# Patient Record
Sex: FEMALE | Race: BLACK OR AFRICAN AMERICAN
[De-identification: names, ages, dates, MRNs, and addresses within clinical notes are randomized per-mention and may not be internally consistent; named-entity substitution may affect disease eponyms.]

---

## 2015-11-06 VITALS — DIASTOLIC BLOOD PRESSURE: 70 MMHG | SYSTOLIC BLOOD PRESSURE: 121 MMHG

## 2017-04-25 ENCOUNTER — HOSPITAL ENCOUNTER (OUTPATIENT)
Dept: HOSPITAL 61 - CT | Age: 62
Discharge: HOME | End: 2017-04-25
Attending: INTERNAL MEDICINE
Payer: COMMERCIAL

## 2017-04-25 DIAGNOSIS — K42.9: Primary | ICD-10-CM

## 2017-04-25 LAB
CREAT SERPL-MCNC: 1.1 MG/DL (ref 0.6–1)
GFR SERPLBLD BASED ON 1.73 SQ M-ARVRAT: 61.5 ML/MIN

## 2017-04-25 PROCEDURE — 36415 COLL VENOUS BLD VENIPUNCTURE: CPT

## 2017-04-25 PROCEDURE — 74176 CT ABD & PELVIS W/O CONTRAST: CPT

## 2017-04-25 PROCEDURE — 82565 ASSAY OF CREATININE: CPT

## 2017-04-25 NOTE — RAD
CT abdomen and pelvis with IV contrast  



History: Abdominal pain, nausea and constipation for numerous years.



Comparison: CT abdomen pelvis 11/2/2012



Technique: After administration of oral contrast only, helical CT of the

abdomen and pelvis was performed from the lung bases through the ischial

tuberosities. Axial, sagittal, and coronal reconstructions were obtained. No

intravenous contrast was administered as proper IV access cannot be obtained.



One or more of the following individualized dose reduction techniques were

utilized for the study:



Automated exposure control

Adjustment of mA and/or kV according to patient's size

Use of iterative reconstruction technique.



Findings:



Evaluation of solid organs is limited by lack of intravenous contrast.



Gallbladder is absent. Liver, spleen, pancreas, and bilateral adrenal glands

are unremarkable.  Bilateral kidneys are without evidence of stone or

obstruction. There is no evidence of bowel obstruction.  No free air or free

fluid is identified in the abdomen or pelvis. Uterus demonstrates changes of

bilateral tubal ligation.



There are 3 small fat-containing ventral hernias.



Appendix appears within normal limits.  Urinary bladder is unremarkable.  



Impression:

1.  No acute abnormality identified in the abdomen or pelvis.

2.  Several small fat-containing umbilical hernias.

## 2017-05-26 ENCOUNTER — HOSPITAL ENCOUNTER (OUTPATIENT)
Dept: HOSPITAL 61 - ENDOS | Age: 62
Discharge: HOME | End: 2017-05-26
Attending: INTERNAL MEDICINE
Payer: COMMERCIAL

## 2017-05-26 VITALS — DIASTOLIC BLOOD PRESSURE: 58 MMHG | SYSTOLIC BLOOD PRESSURE: 12 MMHG

## 2017-05-26 DIAGNOSIS — K64.0: Primary | ICD-10-CM

## 2017-05-26 DIAGNOSIS — J44.9: ICD-10-CM

## 2017-05-26 DIAGNOSIS — F41.9: ICD-10-CM

## 2017-05-26 DIAGNOSIS — Z90.49: ICD-10-CM

## 2017-05-26 DIAGNOSIS — Z98.51: ICD-10-CM

## 2017-05-26 DIAGNOSIS — Z86.69: ICD-10-CM

## 2017-05-26 DIAGNOSIS — M19.90: ICD-10-CM

## 2017-05-26 DIAGNOSIS — Z98.42: ICD-10-CM

## 2017-05-26 DIAGNOSIS — E11.9: ICD-10-CM

## 2017-05-26 DIAGNOSIS — Z87.891: ICD-10-CM

## 2017-05-26 PROCEDURE — 45378 DIAGNOSTIC COLONOSCOPY: CPT

## 2017-05-26 NOTE — HP
ADMIT DATE:  05/26/2017



HISTORY OF PRESENT ILLNESS:  A 61-year-old -American female whose past

medical history is significant for anxiety, COPD, chronic abdominal pain _____

worsening constipation, increased change in bowel habits, despite being on

stable doses of OxyContin.  Previous colonoscopy has been unrevealing, with Dr. Alford.  She has also had increased nausea, but no dysphagia or odynophagia. 

With continued symptoms, she requests additional evaluation.



PAST MEDICAL HISTORY:  Anxiety, chronic pain, diabetes, depression.



MEDICATIONS:  Include Xanax, Nexium, Advair, DuoNeb, metformin, oxycodone,

prednisone, atropine, albuterol, promethazine.



ALLERGIES:  She has no allergies.



SOCIAL HISTORY:  She is a former smoker, social, nondrinker.



PAST SURGICAL HISTORY:  Cholecystectomy, eye surgery, and inguinal hernia

repair.



REVIEW OF SYSTEMS:  Per records.



PHYSICAL EXAMINATION:

GENERAL:  Reveals a well-nourished, well-developed -American female.

VITAL SIGNS:  Temperature is 97, pulse 65, respirations 18.

HEENT:  Normocephalic and atraumatic head.  Pupils and extraocular muscles not

tested.  Sclerae anicteric.

NECK:  Supple.

LUNGS:  Clear.

CARDIOVASCULAR:  Reveals an S1, S2 without S3, S4 or appreciable murmur.

ABDOMEN:  Soft abdomen, normal bowel sounds, without appreciable

hepatosplenomegaly.

EXTREMITIES:  Reveals no cyanosis, clubbing, or edema.



IMPRESSION:  Change in bowel habits, constipation, diffuse abdominal pain,

etiology is to be determined.  Differential includes adhesions, colon cancer,

inflammatory bowel disease, diverticular disease.  Previous CT scan was

unrevealing for additional pathology.  Therefore, recommend colonoscopy, this is

unrevealing a small bowel series may be pursued.



I thank Dr. Blue for allowing us to consult and participate in patient's care.

 



______________________________

JOSE ENRIQUE MORENO MD DR:  SSP/suresh  JOB#:  [id number] / [id number]

DD:  05/26/2017 10:29  DT:  05/26/2017 11:22

## 2018-02-28 ENCOUNTER — HOSPITAL ENCOUNTER (OUTPATIENT)
Dept: HOSPITAL 61 - MAMMO | Age: 63
Discharge: HOME | End: 2018-02-28
Attending: FAMILY MEDICINE
Payer: COMMERCIAL

## 2018-02-28 DIAGNOSIS — Z12.31: Primary | ICD-10-CM

## 2018-02-28 PROCEDURE — 77067 SCR MAMMO BI INCL CAD: CPT

## 2018-03-01 ENCOUNTER — HOSPITAL ENCOUNTER (OUTPATIENT)
Dept: HOSPITAL 61 - NM | Age: 63
Discharge: HOME | End: 2018-03-01
Attending: INTERNAL MEDICINE
Payer: COMMERCIAL

## 2018-03-01 DIAGNOSIS — K86.1: ICD-10-CM

## 2018-03-01 DIAGNOSIS — K30: Primary | ICD-10-CM

## 2018-03-01 PROCEDURE — 78264 GASTRIC EMPTYING IMG STUDY: CPT

## 2018-08-30 ENCOUNTER — HOSPITAL ENCOUNTER (OUTPATIENT)
Dept: HOSPITAL 61 - ENDOS | Age: 63
Discharge: HOME | End: 2018-08-30
Attending: INTERNAL MEDICINE
Payer: COMMERCIAL

## 2018-08-30 VITALS
DIASTOLIC BLOOD PRESSURE: 68 MMHG | DIASTOLIC BLOOD PRESSURE: 68 MMHG | SYSTOLIC BLOOD PRESSURE: 117 MMHG | SYSTOLIC BLOOD PRESSURE: 117 MMHG | DIASTOLIC BLOOD PRESSURE: 68 MMHG | SYSTOLIC BLOOD PRESSURE: 117 MMHG | DIASTOLIC BLOOD PRESSURE: 68 MMHG | SYSTOLIC BLOOD PRESSURE: 117 MMHG

## 2018-08-30 DIAGNOSIS — Z79.84: ICD-10-CM

## 2018-08-30 DIAGNOSIS — Z79.899: ICD-10-CM

## 2018-08-30 DIAGNOSIS — Z90.49: ICD-10-CM

## 2018-08-30 DIAGNOSIS — Z98.890: ICD-10-CM

## 2018-08-30 DIAGNOSIS — K29.50: Primary | ICD-10-CM

## 2018-08-30 DIAGNOSIS — Z72.89: ICD-10-CM

## 2018-08-30 DIAGNOSIS — Z98.51: ICD-10-CM

## 2018-08-30 DIAGNOSIS — G89.29: ICD-10-CM

## 2018-08-30 DIAGNOSIS — E11.40: ICD-10-CM

## 2018-08-30 DIAGNOSIS — F32.9: ICD-10-CM

## 2018-08-30 DIAGNOSIS — Z79.01: ICD-10-CM

## 2018-08-30 DIAGNOSIS — F41.9: ICD-10-CM

## 2018-08-30 DIAGNOSIS — Z87.891: ICD-10-CM

## 2018-08-30 PROCEDURE — 43235 EGD DIAGNOSTIC BRUSH WASH: CPT

## 2018-08-30 NOTE — CONS
DATE OF CONSULTATION:  08/30/2018



REASON FOR CONSULTATION:  Abdominal pain, status post cholecystectomy.



HISTORY OF PRESENT ILLNESS:  This is a 63-year-old -American female whose

past medical history is significant for diabetes, depression, chronic pain,

anxiety and status post cholecystectomy, is seen with recurrent epigastric

abdominal pain which radiates through to her back.  Previous colonoscopy

unrevealing with Dr. Alford, with recurrent nausea and pain, with Emergency Room

visitation.  She is here today for further evaluation.



PAST MEDICAL HISTORY:  Chronic pain, diabetes, neuropathy, status post

cholecystectomy and tubal ligation.



ALLERGIES:  None.



MEDICATIONS:  Include Xanax, Advair, gabapentin, albuterol, metformin,

oxycodone, Ativan and promethazine.



FAMILY AND SOCIAL HISTORY:  Former smoker.  She is a social drinker.



PAST SURGICAL HISTORY:  Status post cholecystectomy, eye surgery, inguinal

hernia repair and tubal ligation.



REVIEW OF SYSTEMS:  Per records.



PHYSICAL EXAMINATION:

GENERAL:  Reveals a well-nourished, well-developed -American female who

is alert and cooperative, in no acute distress.

VITAL SIGNS:  Temperature is 97, pulse is 94 and respirations 20.

HEENT EXAMINATION:  Reveals normocephalic, atraumatic head.  Pupils and

extraocular muscles are not tested.  Sclerae are anicteric.

NECK:  Supple.

LUNGS:  Clear.

CARDIOVASCULAR EXAMINATION:  Reveals an S1, S2, without S3, S4 or appreciable

murmur.

ABDOMEN:  Exam reveals soft abdomen, epigastric tenderness to deep palpation. 

No appreciable hepatosplenomegaly, with a right upper quadrant cholecystectomy

incision.

EXTREMITIES:  Exam reveals no cyanosis, clubbing or edema.



IMPRESSION AND PLAN:  Abdominal pain, status post cholecystectomy, the etiology

is to be determined.  Peptic ulcer disease, gastroparesis, malignancy and

recurrent hernia certainly are in the differential as well as retained common

duct stone.  Therefore, I will do an upper endoscopy.  If that is unrevealing,

then further consideration of ultrasound and/or CAT scan of the abdomen would be

pursued.

 



______________________________

JOSE ENRIQUE MORENO MD



DR:  SSP/suresh  JOB#:  [id number] / [id number]

DD:  08/30/2018 13:42  DT:  08/30/2018 23:11

## 2019-07-18 ENCOUNTER — HOSPITAL ENCOUNTER (OUTPATIENT)
Dept: HOSPITAL 61 - MAMMO | Age: 64
Discharge: HOME | End: 2019-07-18
Attending: FAMILY MEDICINE
Payer: COMMERCIAL

## 2019-07-18 DIAGNOSIS — Z12.31: Primary | ICD-10-CM

## 2019-07-18 PROCEDURE — 77067 SCR MAMMO BI INCL CAD: CPT

## 2019-07-19 NOTE — RAD
DATE: 7/19/2019.



EXAM: DIGITAL SCREEN BILAT W/CAD



HISTORY: Routine screening.



COMPARISON: Previous mammogram from 2018 and 2016.



This study was interpreted with the benefit of Computerized Aided Detection

(CAD).



FINDINGS:



Breast Density: FATTY  The Breast Parenchyma is primarily fatty replaced.

Breast parenchyma level density A.. 



 Skin and nipples are within normal limits.  No suspicious calcifications,

spiculated mass or area of architectural distortion.  







IMPRESSION: No mammographic evidence of malignancy.  Stable mammogram.







BI-RADS CATEGORY: 2 BENIGN FINDING(S)



RECOMMENDED FOLLOW-UP: 12M 12 MONTH FOLLOW-UP



PQRS compliance statement: Patient information was entered into a reminder

system with a target due date for the next mammogram.



Mammography is a sensitive method for finding small breast cancers, but it

does not detect them all and is not a substitute for careful clinical

examination.  A negative mammogram does not negate a clinically suspicious

finding and should not result in delay in biopsying a clinically suspicious

abnormality.



"Our facility is accredited by the [employer] Mammography

Program."

## 2020-03-11 ENCOUNTER — HOSPITAL ENCOUNTER (OUTPATIENT)
Dept: HOSPITAL 61 - RAD | Age: 65
Discharge: HOME | End: 2020-03-11
Attending: FAMILY MEDICINE
Payer: COMMERCIAL

## 2020-03-11 DIAGNOSIS — I51.7: ICD-10-CM

## 2020-03-11 DIAGNOSIS — J98.11: ICD-10-CM

## 2020-03-11 DIAGNOSIS — R91.8: Primary | ICD-10-CM

## 2020-03-11 DIAGNOSIS — Z09: ICD-10-CM

## 2020-03-11 PROCEDURE — 71046 X-RAY EXAM CHEST 2 VIEWS: CPT

## 2020-03-11 NOTE — RAD
AP and Lateral Views of the Chest  3/11/2020 12:00 AM

 

Indication: Follow-up, pneumonia

 

Comparison: Chest radiograph November 6, 2015 

 

Findings: There is mild opacity left lung base with appearance favoring 

atelectasis over infiltrate. No pneumothorax or effusion is seen. Portal 

and cardiomegaly is stable. No acute osseous changes are seen.

 

IMPRESSION: Mild opacity left lung base with an appearance favoring 

atelectasis over infiltrate.

 

Electronically signed by: Cody Leonard MD (3/11/2020 4:36 PM) [id number]

## 2020-06-24 ENCOUNTER — HOSPITAL ENCOUNTER (OUTPATIENT)
Dept: HOSPITAL 61 - KCIC MRI | Age: 65
Discharge: HOME | End: 2020-06-24
Attending: FAMILY MEDICINE
Payer: MEDICAID

## 2020-06-24 DIAGNOSIS — M67.912: ICD-10-CM

## 2020-06-24 DIAGNOSIS — M19.012: Primary | ICD-10-CM

## 2020-06-24 PROCEDURE — 73221 MRI JOINT UPR EXTREM W/O DYE: CPT

## 2020-06-24 NOTE — KCIC
EXAM: MRI left shoulder without contrast 

 

INDICATION: Dysfunctional left rotator cuff. Left shoulder pain worsening 

range of motion in recent months.

 

COMPARISON: None

 

TECHNIQUE: Multiplanar, multisequence imaging of the left shoulder Without

contrast.

 

FINDINGS:

 

The exam is limited by extensive motion artifact.

 

ROTATOR CUFF: There is a suspected full-thickness tear of the anterior and

mid supraspinatus tendon measuring at least 1.9 x 1.3 cm (image 13, series

11, image 16, series 7), with tendinopathy of the posterior supraspinatus 

and infraspinatus tendon. The scapularis and teres minor tendons are 

intact. There is moderate fatty atrophy of the supraspinatus and 

infraspinatus muscles. No rotator cuff muscle edema. 

 

LABRUM: Evaluation of the labrum is limited by motion artifact. No 

discrete tear.

 

BICEPS TENDON: Intra-articular biceps tendon is difficult to visualize due

to motion artifact but likely at least partially intact. Extra-articular 

biceps tendon is intact.

 

ACROMIOCLAVICULAR JOINT: Mild degenerative joint disease.

 

GLENOHUMERAL JOINT: Cartilage appears grossly intact without large full 

thickness defect, however motion artifact on coronal series limits 

evaluation. No acute fracture. Alignment is normal. There are cystic 

changes in the greater tuberosity. 

 

OTHER: Joint effusion communicating with the subacromial-subdeltoid bursa.

 

IMPRESSION:Very limited exam due to extensive motion artifact. There 

appears to be a full-thickness tear of the anterior and mid supraspinatus 

tendon measuring at least 1.9 cm in greatest diameter. Probable 

tendinopathy the posterior supraspinatus and infraspinatus tendons. 

Moderate fatty atrophy of the supraspinatus and infraspinatus muscles.

 

Electronically signed by: Leeann Reza MD (6/24/2020 5:01 PM) [id number]